# Patient Record
(demographics unavailable — no encounter records)

---

## 2024-11-12 NOTE — REASON FOR VISIT
[de-identified] : left thyroid lobectomy [de-identified] : Patient is s/p left thyroid lobectomy. 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 Patient doing well since surgery. Incision within normal limits. TSH/T4 in 4-6 weeks. Will refer for SCHMIDT. Discussed this with patient today all questions were answered and he understands the plan. [id number] left (previous history papillary cancer, right).   Reji Noland DDS MD FACS Professor and Chair Department of Otolaryngology Head and Neck Surgery [facility]

## 2025-05-05 NOTE — PHYSICAL EXAM
[Midline] : trachea located in midline position [Normal] : no rashes [de-identified] : s/p right hemithyroidectomy, scar within normal limits, neck flat and supple, left thyroid nodule noted

## 2025-05-05 NOTE — HISTORY OF PRESENT ILLNESS
[de-identified] : 8/6/24 50M referred by Dr. Samanta Lam with history of right papillary thyroid carcinoma s/p right hemithyroidectomy 2016 with new left thyroid nodule, pathology suspects papillary thyroid carcinoma with cystic features per FNA performed on 7/9/24.  The right cervical node FNA was benign. 6/27/2024: US Thyroid Left lobe: grossly unremarkable in size and morphology -There is a hypoechoic nodule upper pole left lobe, 0.5 cm, TI-RADS 3. Follow up in 6 months  Right Lobe: Not visualized, s/p right hemithyroidectomy Cervical Neck: hypoechoic heterogeneous solid nodule right lateral neck lower segment measuring up to 1.1 x 0.8 cm  7/9/24: FNA Left Thyroid Nodule, Left upper pole -Highly suspicious, suspect papillary thyroid carcinoma with cystic features, [place] Category V  7/9/24: US Guided Biopsy Right Neck, Level IIa 2.0 x 0.5 cm -Mixed population of predominantly small lymphocytes -Notes: finfings suggestive of reactive lymph node. No evidence of metastatic disease is seen.  [FreeTextEntry1] : 5/6/25: Patient here today for 6 month follow up. No new complaints since surgery, healing well. Underwent SCHMIDT in december 2024, no issues since that time.

## 2025-05-05 NOTE — REASON FOR VISIT
[Initial Evaluation] : an initial evaluation for [FreeTextEntry2] : Left thyroid nodule, FNA  suspicious for papillary thyroid carcinoma [de-identified] : left thyroid lobectomy [de-identified] : Patient is s/p left thyroid lobectomy. 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 Patient doing well since surgery. Incision within normal limits. TSH/T4 in 4-6 weeks. Will refer for SCHMIDT. Discussed this with patient today all questions were answered and he understands the plan. [id number] left (previous history papillary cancer, right).   Reji Noland DDS MD FACS Professor and Chair Department of Otolaryngology Head and Neck Surgery [facility]

## 2025-05-05 NOTE — DATA REVIEWED
[de-identified] : FNA results for thyroid and cervical nodes reviewed [de-identified] : US  results reviewed.